# Patient Record
Sex: FEMALE | Race: WHITE | NOT HISPANIC OR LATINO | Employment: OTHER | URBAN - METROPOLITAN AREA
[De-identification: names, ages, dates, MRNs, and addresses within clinical notes are randomized per-mention and may not be internally consistent; named-entity substitution may affect disease eponyms.]

---

## 2023-06-10 ENCOUNTER — OFFICE VISIT (OUTPATIENT)
Dept: URGENT CARE | Facility: URGENT CARE | Age: 68
End: 2023-06-10
Payer: COMMERCIAL

## 2023-06-10 ENCOUNTER — NURSE TRIAGE (OUTPATIENT)
Dept: NURSING | Facility: CLINIC | Age: 68
End: 2023-06-10
Payer: COMMERCIAL

## 2023-06-10 ENCOUNTER — ANCILLARY PROCEDURE (OUTPATIENT)
Dept: GENERAL RADIOLOGY | Facility: CLINIC | Age: 68
End: 2023-06-10
Attending: FAMILY MEDICINE
Payer: COMMERCIAL

## 2023-06-10 VITALS
DIASTOLIC BLOOD PRESSURE: 80 MMHG | HEART RATE: 74 BPM | SYSTOLIC BLOOD PRESSURE: 125 MMHG | TEMPERATURE: 98.5 F | WEIGHT: 260.8 LBS

## 2023-06-10 DIAGNOSIS — S69.91XA FINGER INJURY, RIGHT, INITIAL ENCOUNTER: ICD-10-CM

## 2023-06-10 DIAGNOSIS — S62.619A CLOSED AVULSION FRACTURE OF PROXIMAL PHALANX OF FINGER, INITIAL ENCOUNTER: Primary | ICD-10-CM

## 2023-06-10 PROCEDURE — 99203 OFFICE O/P NEW LOW 30 MIN: CPT | Performed by: FAMILY MEDICINE

## 2023-06-10 PROCEDURE — 73140 X-RAY EXAM OF FINGER(S): CPT | Mod: TC | Performed by: RADIOLOGY

## 2023-06-10 RX ORDER — LISINOPRIL 20 MG/1
20 TABLET ORAL DAILY
COMMUNITY
Start: 2023-02-09

## 2023-06-10 RX ORDER — ATORVASTATIN CALCIUM 20 MG/1
20 TABLET, FILM COATED ORAL DAILY
COMMUNITY
Start: 2023-02-09 | End: 2024-02-09

## 2023-06-10 RX ORDER — LATANOPROST 50 UG/ML
1 SOLUTION/ DROPS OPHTHALMIC
COMMUNITY
Start: 2023-04-07

## 2023-06-10 RX ORDER — AMLODIPINE BESYLATE 10 MG/1
10 TABLET ORAL DAILY
COMMUNITY
Start: 2023-02-09

## 2023-06-10 RX ORDER — HYDROXYCHLOROQUINE SULFATE 200 MG/1
1 TABLET, FILM COATED ORAL DAILY
COMMUNITY
Start: 2023-05-24

## 2023-06-10 RX ORDER — MULTIVIT-MIN/IRON/FA/VIT K/LUT 8MG-400MCG
1 TABLET ORAL DAILY
COMMUNITY

## 2023-06-10 NOTE — TELEPHONE ENCOUNTER
Call from patient who is asking about [facility] after she spoke to [employer] insurance.    Patient was informed of the hours today and address.    Patient verbalized understanding.      Loraine Paz RN, BSN  Triage Nurse Advisor        Reason for Disposition    General information question, no triage required and triager able to answer question    Protocols used: INFORMATION ONLY CALL - NO TRIAGE-A-

## 2023-06-11 NOTE — PATIENT INSTRUCTIONS
Wear the brace until follow up with your primary provider next week.   Ice, tylenol as needed for discomfort.      If any severe pain, the finger turns cold, blue or numb - go to the ER right away.

## 2023-06-11 NOTE — PROGRESS NOTES
SUBJECTIVE:  Lauri Spivey is a 68 year old female who presents for  injury:  Location: right ring finger  Occured how long ago?: today  How?:  At a garage sale, stacking tables in the rain.  Either jammed the finger or crushed the finger.   No numbness or tingling.  The finger was bruised and swollen right away.  Painful immediately, then seemed to improve, now a little more painful.  Hasn't taken her night time pain medication yet.        EXAM:   /80 (BP Location: Right arm, Patient Position: Sitting, Cuff Size: Adult Large)   Pulse 74   Temp 98.5  F (36.9  C) (Oral)   Wt 118.3 kg (260 lb 12.9 oz)   General: healthy, alert and no distress  Involved injury area: right ring finger  Description of injury site {EXAM INJURY: over the proximal ring finger -  Swelling, Bruising, Diffuse tenderness   Skin: Intact at site  Neurovascular status: There is not compromise to the distal circulation.    Range of motion of the affected area: able to nearly fully extend the finger and can flex the finger at the pip and dip joints    X-RAY was done.  Appears to be an avulsion fracture at the distal end of the proximal phalanx of the right ring finger.   Official read by radiology pending.  The clinic will call patient if result different than discussed during visit today.      ASSESSMENT/PLAN:     ICD-10-CM    1. Closed avulsion fracture of proximal phalanx of finger, initial encounter  S62.619A     rignt ring finger      2. Finger injury, right, initial encounter  S69.91XA XR Finger Right G/E 2 Views        Aluminum brace taped on to the finger in office.  Symptomatic cares reviewed.  She has pain meds at home for RA and does not want opioids at this time.  Discussed if pain not well controlled, to let us/her PCP know.    Patient Instructions   Wear the brace until follow up with your primary provider next week.   Ice, tylenol as needed for discomfort.      If any severe pain, the finger turns cold, blue or numb - go to  the ER right away.

## 2023-08-20 ENCOUNTER — HEALTH MAINTENANCE LETTER (OUTPATIENT)
Age: 68
End: 2023-08-20

## 2024-10-13 ENCOUNTER — HEALTH MAINTENANCE LETTER (OUTPATIENT)
Age: 69
End: 2024-10-13

## 2025-02-22 ENCOUNTER — HEALTH MAINTENANCE LETTER (OUTPATIENT)
Age: 70
End: 2025-02-22